# Patient Record
Sex: MALE | Race: WHITE | Employment: FULL TIME | URBAN - METROPOLITAN AREA
[De-identification: names, ages, dates, MRNs, and addresses within clinical notes are randomized per-mention and may not be internally consistent; named-entity substitution may affect disease eponyms.]

---

## 2019-09-03 ENCOUNTER — HOSPITAL ENCOUNTER (OUTPATIENT)
Dept: PREADMISSION TESTING | Age: 64
Discharge: HOME | End: 2019-09-03
Attending: UROLOGY
Payer: COMMERCIAL

## 2019-09-03 LAB
ATRIAL RATE: 52 BPM
CALCULATED P AXIS, ECG09: 39 DEGREES
CALCULATED R AXIS, ECG10: 71 DEGREES
CALCULATED T AXIS, ECG11: 68 DEGREES
DIAGNOSIS, 93000: NORMAL
HCT VFR BLD AUTO: 42.8 % (ref 36–48)
HGB BLD-MCNC: 14 G/DL (ref 13–16)
P-R INTERVAL, ECG05: 178 MS
Q-T INTERVAL, ECG07: 426 MS
QRS DURATION, ECG06: 88 MS
QTC CALCULATION (BEZET), ECG08: 396 MS
VENTRICULAR RATE, ECG03: 52 BPM

## 2019-09-03 PROCEDURE — 36415 COLL VENOUS BLD VENIPUNCTURE: CPT

## 2019-09-03 PROCEDURE — 85018 HEMOGLOBIN: CPT

## 2019-09-03 PROCEDURE — 93005 ELECTROCARDIOGRAM TRACING: CPT

## 2019-09-08 PROBLEM — R97.20 ELEVATED PSA: Status: ACTIVE | Noted: 2019-09-08

## 2019-09-09 NOTE — H&P
Urology [address]  [address] [facility]  [address]  Tel: [phone]  Fax: [phone]        Patient: Paulo Quezada  YOB: 1955          This 60year old  patient was referred by Becca Oglesby for consultation of elevated PSA. Assessment/Plan  # Detail Type Description    1. Assessment Elevated prostate specific antigen [PSA] (R97.20). Patient Plan  He has an elevated PSA. We discussed that this may represent a benign entity, but that there is a 25-30% chance that this is cancer. He will proceed with prostate biopsy. The risks of bleeding, infection, and possible need for repeat biopsies in the future were discussed. He will proceed. Scripts for Cipro and Fleet enema given. He will do this in the OR. This 60year old male presents for Elevated PSA Uro. History of Present Illness:  1. Elevated PSA Uro   The onset was approximately 3 months ago. The problem is currently stable. Severity level is described as no pain. Reviewed today was a  PSA taken on 07/12/2019 with findings of 6.49 ng/mL. PSA taken on 05/14/2019 with findings of 6.827 ng/mL. Pertinent history includes age over 48. There are no associated symptoms. Additional information: no recent UTI and the patient is sexually active. PAST MEDICAL/SURGICAL HISTORY   (Detailed)    Disease/disorder Onset Date Management Date Comments     Hernia 1979      tonsillectomy       Cervical fusion 6/2007    Torn meninscus  Left knee arthroscopy           PROBLEM LIST:   Problem List reviewed. Problem Description Onset Date Chronic Clinical Status Notes   Benign prostatic hyperplasia 10/06/2009 Y  ( by exam )   Osteoarthritis 10/06/2009 Y  cervical   Impaired fasting glycaemia 03/30/2010 Y     Mixed hyperlipidemia 03/30/2010 Y           Medication Reconciliation  Medications reconciled today.     Allergies  Ingredient Reaction (Severity) Medication Name Comment ERYTHROMYCIN BASE GI Problems     MELOXICAM gi upset       Reviewed, no changes. Family History  (Detailed)  Relationship Family Member Name  Age at Death Condition Onset Age Cause of Death   Father    Stroke  N   Father    Hyperlipidemia  N   Father    Hypertension  N   Father    Diabetes mellitus  N   Maternal grandfather    Cancer, colon  N   Mother    Cancer - breast     Paternal grandfather    Cancer, lung  N         Social History:  (Detailed)  Preferred language is [place]. Born in [place]. EDUCATION/EMPLOYMENT/OCCUPATION  Employment History Status Retired Restrictions   [employer] full-time     [employer] full-time       MARITAL STATUS/FAMILY/SOCIAL SUPPORT  Currently . Tobacco use status: Never smoked tobacco.  Smoking status: Never smoker. ALCOHOL  There is a history of alcohol use. consumed occasionally. LIFESTYLE  Moderate activity level. Exercises 2-3 times a week. Review of Systems  System Neg/Pos Details   Constitutional Negative Chills and Fever. ENMT Negative Ear infections and Sore throat. Eyes Negative Blurred vision, Double vision and Eye pain. Respiratory Negative Asthma, Chronic cough, Dyspnea and Wheezing. Cardio Negative Chest pain. GI Negative Constipation, Decreased appetite, Diarrhea, Nausea and Vomiting. Endocrine Negative Cold intolerance, Heat intolerance, Increased thirst and Weight loss. Neuro Negative Headache and Tremors. Psych Negative Anxiety and Depression. Integumentary Negative Itching skin and Rash. MS Negative Back pain and Joint pain. Hema/Lymph Negative Easy bleeding. Reproductive Negative Sexual dysfunction.      Vital Signs     Height  Time ft in cm Last Measured Height Position   10:21 AM 5.0 10.50 179.07 2019 Standing     Weight/BSA/BMI  Time lb oz kg Context BMI kg/m2 BSA m2   10:21 .00  95.254 dressed with shoes 29.71      Blood Pressure  Time BP mm/Hg Position Side Site Method Cuff Size   10:21 /108 sitting right  automatic adult     Temperature/Pulse/Respiration  Time Temp F Temp C Temp Site Pulse/min Pattern Resp/ min   10:21 AM 97.70 36.50  59       Measured By  Time Measured by   10:21 AM [place]       Physical Exam  Exam Findings Details   Constitutional Normal Well developed. Neck Exam Normal Inspection - Normal.   Respiratory Normal Inspection - Normal.   Abdomen Normal No abdominal tenderness. Genitourinary Normal Penis - Normal. Urethral meatus - Normal. Scrotum - Normal. Epididymides - Normal. Lymph nodes - Normal. Testes - Normal. No CVA tenderness. No suprapubic tenderness. Rectal * Prostate - Findings: 3+ enlarged (40-50 grams). Rectal Normal Anus - Normal. Sphincter - Normal.   Extremity Normal No edema. Psychiatric Normal Orientation - Oriented to time, place, person & situation. Appropriate mood and affect. Patient Education  # Patient Education   1.  Prostate-Specific Antigen (PSA) Test:~     Medications (added, continued, or stopped today)  Start Date Medication Directions PRN Status PRN Reason Instruction Stop Date   08/14/2019 Cipro 500 mg tablet take 1 by oral route once for 12 hours start day prior to biopsy N  Start taking on the day prior to the biopsy    08/14/2019 Fleet Enema 19 gram-7 gram/118 mL take by Rectal route 3 hours prior to biopsy N      Active Patient Care Team Members    Name Contact Agency Type Support Role Relationship Active Date Inactive Date Specialty   Smita Oswald   Emergency Contact Child, Father is the Patient      Blossom Roca   Patient provider PCP   [address] [facility]   encounter provider    Urology   Pebbles Flores    Spouse          Provider:         Gisela Overton MD

## 2019-09-10 ENCOUNTER — ANESTHESIA EVENT (OUTPATIENT)
Dept: SURGERY | Age: 64
End: 2019-09-10
Payer: COMMERCIAL

## 2019-09-10 ENCOUNTER — APPOINTMENT (OUTPATIENT)
Dept: ULTRASOUND IMAGING | Age: 64
End: 2019-09-10
Attending: UROLOGY
Payer: COMMERCIAL

## 2019-09-10 ENCOUNTER — HOSPITAL ENCOUNTER (OUTPATIENT)
Age: 64
Setting detail: OUTPATIENT SURGERY
Discharge: HOME | End: 2019-09-10
Attending: UROLOGY | Admitting: UROLOGY
Payer: COMMERCIAL

## 2019-09-10 ENCOUNTER — ANESTHESIA (OUTPATIENT)
Dept: SURGERY | Age: 64
End: 2019-09-10
Payer: COMMERCIAL

## 2019-09-10 VITALS
DIASTOLIC BLOOD PRESSURE: 72 MMHG | HEIGHT: 70 IN | BODY MASS INDEX: 29.79 KG/M2 | OXYGEN SATURATION: 98 % | RESPIRATION RATE: 12 BRPM | WEIGHT: 208.1 LBS | HEART RATE: 50 BPM | TEMPERATURE: 98.3 F | SYSTOLIC BLOOD PRESSURE: 113 MMHG

## 2019-09-10 DIAGNOSIS — R97.20 ABNORMAL PROSTATE SPECIFIC ANTIGEN (PSA): ICD-10-CM

## 2019-09-10 PROCEDURE — G0416 PROSTATE BIOPSY, ANY MTHD: HCPCS

## 2019-09-10 PROCEDURE — 77030040361 HC SLV COMPR DVT MDII -B: Performed by: UROLOGY

## 2019-09-10 PROCEDURE — 76942 ECHO GUIDE FOR BIOPSY: CPT

## 2019-09-10 PROCEDURE — 74011250636 HC RX REV CODE- 250/636: Performed by: UROLOGY

## 2019-09-10 PROCEDURE — 76060000031 HC ANESTHESIA FIRST 0.5 HR: Performed by: UROLOGY

## 2019-09-10 PROCEDURE — 77030020782 HC GWN BAIR PAWS FLX 3M -B: Performed by: UROLOGY

## 2019-09-10 PROCEDURE — 74011250636 HC RX REV CODE- 250/636

## 2019-09-10 PROCEDURE — 76010000154 HC OR TIME FIRST 0.5 HR: Performed by: UROLOGY

## 2019-09-10 PROCEDURE — 76210000022 HC REC RM PH II 1.5 TO 2 HR: Performed by: UROLOGY

## 2019-09-10 PROCEDURE — 77030018836 HC SOL IRR NACL ICUM -A: Performed by: UROLOGY

## 2019-09-10 PROCEDURE — 77030003481 HC NDL BIOP GUN BARD -B: Performed by: UROLOGY

## 2019-09-10 PROCEDURE — 74011000250 HC RX REV CODE- 250

## 2019-09-10 PROCEDURE — 88344 IMHCHEM/IMCYTCHM EA MLT ANTB: CPT

## 2019-09-10 PROCEDURE — 74011250637 HC RX REV CODE- 250/637: Performed by: ANESTHESIOLOGY

## 2019-09-10 RX ORDER — NALOXONE HYDROCHLORIDE 0.4 MG/ML
0.1 INJECTION, SOLUTION INTRAMUSCULAR; INTRAVENOUS; SUBCUTANEOUS
Status: DISCONTINUED | OUTPATIENT
Start: 2019-09-10 | End: 2019-09-10 | Stop reason: HOSPADM

## 2019-09-10 RX ORDER — ONDANSETRON 2 MG/ML
4 INJECTION INTRAMUSCULAR; INTRAVENOUS ONCE
Status: DISCONTINUED | OUTPATIENT
Start: 2019-09-10 | End: 2019-09-10 | Stop reason: HOSPADM

## 2019-09-10 RX ORDER — SODIUM CHLORIDE, SODIUM LACTATE, POTASSIUM CHLORIDE, CALCIUM CHLORIDE 600; 310; 30; 20 MG/100ML; MG/100ML; MG/100ML; MG/100ML
50 INJECTION, SOLUTION INTRAVENOUS CONTINUOUS
Status: DISCONTINUED | OUTPATIENT
Start: 2019-09-10 | End: 2019-09-10 | Stop reason: HOSPADM

## 2019-09-10 RX ORDER — OXYCODONE AND ACETAMINOPHEN 5; 325 MG/1; MG/1
1 TABLET ORAL AS NEEDED
Status: DISCONTINUED | OUTPATIENT
Start: 2019-09-10 | End: 2019-09-10 | Stop reason: HOSPADM

## 2019-09-10 RX ORDER — MAGNESIUM SULFATE 100 %
4 CRYSTALS MISCELLANEOUS AS NEEDED
Status: DISCONTINUED | OUTPATIENT
Start: 2019-09-10 | End: 2019-09-10 | Stop reason: HOSPADM

## 2019-09-10 RX ORDER — LIDOCAINE HYDROCHLORIDE 20 MG/ML
INJECTION, SOLUTION EPIDURAL; INFILTRATION; INTRACAUDAL; PERINEURAL AS NEEDED
Status: DISCONTINUED | OUTPATIENT
Start: 2019-09-10 | End: 2019-09-10 | Stop reason: HOSPADM

## 2019-09-10 RX ORDER — CIPROFLOXACIN 2 MG/ML
400 INJECTION, SOLUTION INTRAVENOUS ONCE
Status: COMPLETED | OUTPATIENT
Start: 2019-09-10 | End: 2019-09-10

## 2019-09-10 RX ORDER — FENTANYL CITRATE 50 UG/ML
25 INJECTION, SOLUTION INTRAMUSCULAR; INTRAVENOUS
Status: DISCONTINUED | OUTPATIENT
Start: 2019-09-10 | End: 2019-09-10 | Stop reason: HOSPADM

## 2019-09-10 RX ORDER — FENTANYL CITRATE 50 UG/ML
INJECTION, SOLUTION INTRAMUSCULAR; INTRAVENOUS AS NEEDED
Status: DISCONTINUED | OUTPATIENT
Start: 2019-09-10 | End: 2019-09-10 | Stop reason: HOSPADM

## 2019-09-10 RX ORDER — INSULIN LISPRO 100 [IU]/ML
INJECTION, SOLUTION INTRAVENOUS; SUBCUTANEOUS ONCE
Status: DISCONTINUED | OUTPATIENT
Start: 2019-09-10 | End: 2019-09-10 | Stop reason: HOSPADM

## 2019-09-10 RX ORDER — HYDROMORPHONE HYDROCHLORIDE 2 MG/ML
0.5 INJECTION, SOLUTION INTRAMUSCULAR; INTRAVENOUS; SUBCUTANEOUS
Status: DISCONTINUED | OUTPATIENT
Start: 2019-09-10 | End: 2019-09-10 | Stop reason: HOSPADM

## 2019-09-10 RX ORDER — ONDANSETRON 2 MG/ML
INJECTION INTRAMUSCULAR; INTRAVENOUS AS NEEDED
Status: DISCONTINUED | OUTPATIENT
Start: 2019-09-10 | End: 2019-09-10 | Stop reason: HOSPADM

## 2019-09-10 RX ORDER — SODIUM CHLORIDE, SODIUM LACTATE, POTASSIUM CHLORIDE, CALCIUM CHLORIDE 600; 310; 30; 20 MG/100ML; MG/100ML; MG/100ML; MG/100ML
125 INJECTION, SOLUTION INTRAVENOUS CONTINUOUS
Status: DISCONTINUED | OUTPATIENT
Start: 2019-09-10 | End: 2019-09-10 | Stop reason: HOSPADM

## 2019-09-10 RX ORDER — PROPOFOL 10 MG/ML
INJECTION, EMULSION INTRAVENOUS AS NEEDED
Status: DISCONTINUED | OUTPATIENT
Start: 2019-09-10 | End: 2019-09-10 | Stop reason: HOSPADM

## 2019-09-10 RX ADMIN — FENTANYL CITRATE 25 MCG: 50 INJECTION, SOLUTION INTRAMUSCULAR; INTRAVENOUS at 11:07

## 2019-09-10 RX ADMIN — FENTANYL CITRATE 25 MCG: 50 INJECTION, SOLUTION INTRAMUSCULAR; INTRAVENOUS at 11:13

## 2019-09-10 RX ADMIN — ONDANSETRON 4 MG: 2 INJECTION INTRAMUSCULAR; INTRAVENOUS at 11:13

## 2019-09-10 RX ADMIN — PROPOFOL 50 MG: 10 INJECTION, EMULSION INTRAVENOUS at 11:16

## 2019-09-10 RX ADMIN — OXYCODONE HYDROCHLORIDE AND ACETAMINOPHEN 1 TABLET: 5; 325 TABLET ORAL at 12:20

## 2019-09-10 RX ADMIN — SODIUM CHLORIDE, SODIUM LACTATE, POTASSIUM CHLORIDE, AND CALCIUM CHLORIDE 125 ML/HR: 600; 310; 30; 20 INJECTION, SOLUTION INTRAVENOUS at 10:28

## 2019-09-10 RX ADMIN — LIDOCAINE HYDROCHLORIDE 40 MG: 20 INJECTION, SOLUTION EPIDURAL; INFILTRATION; INTRACAUDAL; PERINEURAL at 11:07

## 2019-09-10 RX ADMIN — FENTANYL CITRATE 25 MCG: 50 INJECTION, SOLUTION INTRAMUSCULAR; INTRAVENOUS at 11:10

## 2019-09-10 RX ADMIN — PROPOFOL 50 MG: 10 INJECTION, EMULSION INTRAVENOUS at 11:07

## 2019-09-10 RX ADMIN — SODIUM CHLORIDE, SODIUM LACTATE, POTASSIUM CHLORIDE, AND CALCIUM CHLORIDE 125 ML/HR: 600; 310; 30; 20 INJECTION, SOLUTION INTRAVENOUS at 12:16

## 2019-09-10 RX ADMIN — CIPROFLOXACIN 400 MG: 2 INJECTION, SOLUTION INTRAVENOUS at 11:10

## 2019-09-10 RX ADMIN — PROPOFOL 50 MG: 10 INJECTION, EMULSION INTRAVENOUS at 11:13

## 2019-09-10 RX ADMIN — PROPOFOL 50 MG: 10 INJECTION, EMULSION INTRAVENOUS at 11:10

## 2019-09-10 NOTE — PERIOP NOTES
Reviewed PTA medication list with patient/caregiver and patient/caregiver denies any additional medications. Patient admits to having a responsible adult care for them for at least 24 hours after surgery.     Dual skin assessment completed by Nessa BRYAN and oJi Denton RN.

## 2019-09-10 NOTE — DISCHARGE INSTRUCTIONS
DISCHARGE SUMMARY from Nurse    PATIENT INSTRUCTIONS:    After general anesthesia or intravenous sedation, for 24 hours or while taking prescription Narcotics:  · Limit your activities  · Do not drive and operate hazardous machinery  · Do not make important personal or business decisions  · Do  not drink alcoholic beverages  · If you have not urinated within 8 hours after discharge, please contact your surgeon on call. Report the following to your surgeon:  · Excessive pain, swelling, redness or odor of or around the surgical area  · Temperature over 100.5  · Nausea and vomiting lasting longer than 4 hours or if unable to take medications  · Any signs of decreased circulation or nerve impairment to extremity: change in color, persistent  numbness, tingling, coldness or increase pain  · Any questions    What to do at Home:  REGULAR DIET [address] OF RECTAL BLEEDING OR DRAINAGE TO [facility]  DR Wanda [address] WILL CALL REGARDING A POST OP CHECK UP    If you experience any of the following symptoms heavy bleeding, fevers, severe pain, unable to void, please follow up with dr Roya Jernigan    *  Please give a list of your current medications to your Primary Care Provider. *  Please update this list whenever your medications are discontinued, doses are      changed, or new medications (including over-the-counter products) are added. *  Please carry medication information at all times in case of emergency situations. These are general instructions for a healthy lifestyle:    No smoking/ No tobacco products/ Avoid exposure to second hand smoke  Surgeon General's Warning:  Quitting smoking now greatly reduces serious risk to your health.     Obesity, smoking, and sedentary lifestyle greatly increases your risk for illness    A healthy diet, regular physical exercise & weight monitoring are important for maintaining a healthy lifestyle    You may be retaining fluid if you have a history of heart failure or if you experience any of the following symptoms:  Weight gain of 3 pounds or more overnight or 5 pounds in a week, increased swelling in our hands or feet or shortness of breath while lying flat in bed. Please call your doctor as soon as you notice any of these symptoms; do not wait until your next office visit. The discharge information has been reviewed with the patient and caregiver. The patient and caregiver verbalized understanding. Discharge medications reviewed with the patient and caregiver and appropriate educational materials and side effects teaching were provided.   ___________________________________________________________________________________________________________________________________    Patient armband removed and shredded

## 2019-09-10 NOTE — ANESTHESIA POSTPROCEDURE EVALUATION
Post-Anesthesia Evaluation and Assessment    Cardiovascular Function/Vital Signs  Visit Vitals  /66   Pulse 62   Temp 36.8 °C (98.3 °F)   Resp 12   Ht 5' 10\" (1.778 m)   Wt 94.4 kg (208 lb 1.6 oz)   SpO2 96%   BMI 29.86 kg/m²       Patient is status post Procedure(s):  TRANSRECTAL ULTRASOUND GUIDED PROSTATE BIOPSY. Nausea/Vomiting: Controlled. Postoperative hydration reviewed and adequate. Pain:  Pain Scale 1: Numeric (0 - 10) (09/10/19 1138)  Pain Intensity 1: 3 (09/10/19 1221)   Managed. Neurological Status:   Neuro (WDL): Within Defined Limits (09/10/19 1028)   At baseline. Mental Status and Level of Consciousness: Baseline and stable. Pulmonary Status:   O2 Device: Room air (09/10/19 1138)   Adequate oxygenation and airway patent. Complications related to anesthesia: None    Post-anesthesia assessment completed. No concerns. Patient has met all discharge requirements.     Signed By: Amy Longo MD

## 2019-09-10 NOTE — OP NOTES
PREOPERATIVE DIAGNOSIS:  Elevated prostate-specific antigen.     POSTOPERATIVE DIAGNOSIS:  Elevated prostate-specific antigen.     PROCEDURES PERFORMED:  Transrectal ultrasound, ultrasound needle guidance,  and prostate biopsy.     SURGEON:  Kristen Jernigan M.D.     ASSISTANT:  None     ANESTHESIA:  MAC     ESTIMATED BLOOD LOSS:  Minimal.     SPECIMENS REMOVED:  Right apex, right base, right mid, left apex, left  base, and left mid of prostate.     FINDINGS:  heterogeneous prostate w/o lesions.  There was a 40.18 mL in size prostate.     COMPLICATIONS:  None.     IMPLANTS:  None.     CLINICAL NOTE:  The patient is a very pleasant 60year-old gentleman with an elevated PSA.        DESCRIPTION OF PROCEDURE:  Preoperatively, the risks and benefits of  surgery were described to the patient.  The risks include, but are not  limited to, bleeding, infection, and possible need for a future procedure. The patient assumed the risks and signed the informed consent.  The patient  was taken to the operating room and placed on the OR table in supine  position.  He was administered a MAC anesthetic.  He was administered  intravenous antibiotics.  He was placed in dorsolithotomy position.  A  transrectal ultrasound probe was then inserted transanally without  difficulty into the rectum.  Ultrasound pictures were taken of the prostate  in transverse and longitudinal views.  The prostate was measured using the  standard Eliptoid formula and was found to be 40.18 mL in size.  The  ultrasound views were obtained of the seminal vesicles, which were normal;  the transition zone, which was normal; the right periprosthetic tissue,  which was normal; and the left periprosthetic tissue, which was normal.  The left apex, mid and base, showed no hypoechoic lesions or areas of  abnormality.  At this point, using ultrasound guidance, biopsies were taken. At the left base, 2 biopsies were taken. At the left mid 2 biopsies were taken.    At the left apex 2 were taken.  Then, 2 biopsies were taken at the  right base, 2 biopsies were taken at the right mid, and 2 biopsies  taken at the right apex.  At this point, the ultrasound probe was removed. There was mild bleeding.   2 min of transrectal pressure was applied digitally and a packing was placed.    The patient was taken out of lithotomy position, revived from anesthesia, and taken to recovery in stable condition.     Alexa Duenas MD

## 2019-09-10 NOTE — INTERVAL H&P NOTE
H&P Update:  Kaela Sauceda was seen and examined. History and physical has been reviewed. The patient has been examined.  There have been no significant clinical changes since the completion of the originally dated History and Physical.

## 2020-09-14 ENCOUNTER — HOSPITAL ENCOUNTER (OUTPATIENT)
Dept: LAB | Age: 65
Discharge: HOME | End: 2020-09-14
Payer: COMMERCIAL

## 2020-09-14 PROCEDURE — 88305 TISSUE EXAM BY PATHOLOGIST: CPT

## (undated) DEVICE — DRAPE TWL SURG 16X26IN BLU ORB04] ALLCARE INC]

## (undated) DEVICE — MAX-CORE® DISPOSABLE CORE BIOPSY INSTRUMENT, 18G X 25CM: Brand: MAX-CORE

## (undated) DEVICE — PAD,NON-ADHERENT,3X8,STERILE,LF,1/PK: Brand: MEDLINE

## (undated) DEVICE — SKIN MARKER,REGULAR TIP WITH RULER AND LABELS: Brand: DEVON

## (undated) DEVICE — GARMENT,MEDLINE,DVT,INT,CALF,MED, GEN2: Brand: MEDLINE

## (undated) DEVICE — SPONGE GZ W4XL4IN COT 12 PLY TYP VII WVN C FLD DSGN

## (undated) DEVICE — STRAP,POSITIONING,KNEE/BODY,FOAM,4X60": Brand: MEDLINE

## (undated) DEVICE — SOL IRRIGATION INJ NACL 0.9% 500ML BTL